# Patient Record
Sex: FEMALE | Race: BLACK OR AFRICAN AMERICAN
[De-identification: names, ages, dates, MRNs, and addresses within clinical notes are randomized per-mention and may not be internally consistent; named-entity substitution may affect disease eponyms.]

---

## 2017-08-16 ENCOUNTER — HOSPITAL ENCOUNTER (OUTPATIENT)
Dept: HOSPITAL 47 - RADUSWWP | Age: 22
End: 2017-08-16
Attending: OBSTETRICS & GYNECOLOGY
Payer: COMMERCIAL

## 2017-08-16 DIAGNOSIS — O44.42: Primary | ICD-10-CM

## 2017-08-16 DIAGNOSIS — Z3A.15: ICD-10-CM

## 2017-08-16 PROCEDURE — 76805 OB US >/= 14 WKS SNGL FETUS: CPT

## 2017-08-16 NOTE — US
EXAMINATION TYPE: US OB >= 14 wk fetus

 

DATE OF EXAM: 8/16/2017

 

COMPARISON: None

 

CLINICAL HISTORY: 22-year-old female O99.211 Obesity affecting pregnancy. Confirm Dates

 

TECHNIQUE:  Transabdominal (TA)

 

FINDINGS:

 

GESTATIONAL AGE / DATING

Physician Established: (14 weeks/2 days)

** EDC:  02/12/2018

Dates by LMP:  Unknown

Dates by First Scan: No prior

Dates by Current Scan: (15 weeks/3 days +/- 1 week 1 day)

** EDC: 02/04/2018

 

FETAL SURVEY

IUP:  Single

 

PLACENTA: Posterior     

PREVIA:  No Previa, however, the placenta is low lying with the caudal placental margin located 2.1 c
m from the internal cervical os.

 

SEBASTIEN: 11.4 cm Normal

 

CERVICAL LENGTH (transabdominal: norm > 3.0cm): 3.7 cm

 

 

FETAL BIOMETRY

PRESENTATION:  Breech

 

BPD: 3.0 cm**  15 weeks / 4 days

HC: 11.3 cm**  15 weeks / 4 days

AC: 9.2 cm**  15 weeks / 3 days

FL: 1.7 cm**  15 weeks / 0 days

 

ESTIMATED FETAL WEIGHT IN GRAMS:  117.8 grams

ESTIMATED FETAL WEIGHT IN LBS/OZS:  0 lbs. 4 oz. 

WEIGHT PERCENTAGE BASED ON ESTABLISHED DATES:   92.8%

 

HC/AC: 1.23 Normal

FL/AC: 18 Normal 

 

HEART RATE:  166 bpm 

RHYTHM:  Normal

 

 

**Single, viable IUP/ No abnormality seen at this time**

 

 

 

 

IMPRESSION:

 

1. Single live intrauterine pregnancy with established gestational age of 14 weeks 2 days. Current ul
trasound biometry is larger and just barely concordant (15 weeks 3 days). This places the gestation a
t the 93rd percentile for weight. Continued follow-up as clinically indicated.

2. Low-lying posterior placenta with the inferior placental tip measuring 2.1 cm from the internal ce
rvical os.

3. Complete fetal survey recommended at 18-20 weeks.

## 2017-09-15 ENCOUNTER — HOSPITAL ENCOUNTER (OUTPATIENT)
Dept: HOSPITAL 47 - RADUSWWP | Age: 22
Discharge: HOME | End: 2017-09-15
Attending: OBSTETRICS & GYNECOLOGY
Payer: COMMERCIAL

## 2017-09-15 DIAGNOSIS — Z3A.00: ICD-10-CM

## 2017-09-15 DIAGNOSIS — O99.212: Primary | ICD-10-CM

## 2017-09-15 PROCEDURE — 76811 OB US DETAILED SNGL FETUS: CPT

## 2017-09-15 NOTE — US
EXAMINATION TYPE: US OB fetal anatomy transabd

 

DATE OF EXAM: 9/15/2017

 

COMPARISON: NONE

 

HISTORY: O99.212 Obesity Effecting Pregnancy 2nd Trimester

 

TECHNIQUE:  Transabdominal (TA)

 

EXAM MEASUREMENTS:

 

GESTATIONAL AGE / DATING

Physician Established: (19 weeks/5 days)

** EDC:  02/04/18

Dates by LMP:  unknown

Dates by First Scan:  (19 weeks/5 days)

** EDC: 02/04/18

Dates by Current Scan for:  (20 weeks/2 days)

** EDC: 01/31/18

 

FETAL SURVEY

IUP:  Single

PLACENTA: Posterior     

PREVIA: No previa   

** SEBASTIEN: 16.9 cm Normal

CERVICAL LENGTH (transabdominal: norm > 3.0cm): 3.4 cm

 

 

FETAL BIOMETRY

PRESENTATION:   Variable

FETAL LIE:  Transverse lie with head maternal LT

BPD: 4.6 cm

**  20 weeks / 0 days

HC: 17.6 cm

**  20 weeks / 1 days

AC: 14.8 cm

**  20 weeks / 1 days

FL: 3.4 cm

**  20 weeks / 5 days

ESTIMATED FETAL WEIGHT IN GRAMS:  346 grams

ESTIMATED FETAL WEIGHT IN LBS/OZ:  0 lbs. 12 oz. 

WEIGHT PERCENTAGE BASED ON ESTABLISHED DATE:  79 %

** HC/AC:  1.19  Normal 

** FL/AC:  23% Normal

HEART RATE:  144 bpm 

RHYTHM: Normal

 

ANATOMY SEEN (within normal limits): 

* Lateral Vent (< 1 cm) 0.8 cm

* Cisterna Magna (< 1.1 cm) 0.4 cm

* Nuchal Fold (< 0.6 cm) 0.3 cm

* Cerebellum (varies with age) 1.8 cm

Choroid Plexus (bilateral)

Midline Falx 

Cavus Septi Pellucidi   

Four Chamber Heart

Outflow tracts:  LVOT/RVOT

Stomach

Situs

Nose / Lips 

Diaphragm 

Kidneys (bilateral) 

Bladder

Cord Insert 

Three Vessel Cord 

Longitudinal Spine 

Transverse Spine 

Arms (bilateral)

Legs (bilateral)

 

MATERNAL WALL MEASUREMENT:  4.2 cm from skin to anterior uterine wall (if exam limited due to body ha
bitus).

 

Single viable IUP 20wks/2days with ALEX of 01/31/18

 

 

 

IMPRESSION: There is satisfactory fetal growth compared to old exam of 8/16/2017. I see no complicati
ng process.

## 2017-11-24 ENCOUNTER — HOSPITAL ENCOUNTER (OUTPATIENT)
Dept: HOSPITAL 47 - RADUSWWP | Age: 22
Discharge: HOME | End: 2017-11-24
Attending: OBSTETRICS & GYNECOLOGY
Payer: COMMERCIAL

## 2017-11-24 DIAGNOSIS — O99.212: Primary | ICD-10-CM

## 2017-11-24 DIAGNOSIS — Z3A.30: ICD-10-CM

## 2017-11-24 PROCEDURE — 76805 OB US >/= 14 WKS SNGL FETUS: CPT

## 2017-11-24 NOTE — US
EXAMINATION TYPE: US OB >= 14 wk fetus

 

DATE OF EXAM: 11/24/2017

 

COMPARISON: 8/16/2017

 

HISTORY:  22-year-old female O99.212 Obesity complicating pregnancy, second trimester.

 

TECHNIQUE:  Transabdominal (TA)

 

FINDINGS:

 

EXAM MEASUREMENTS:

 

GESTATIONAL AGE / DATING

Physician Established: (29 weeks/5 days)

** EDC:  02/04/2018

Dates by LMP:  (28 weeks/ 4   days)

** EDC: 02/12/2018

Dates by First Scan:  (29 weeks/5 days)

** EDC: 02/04/2018

Dates by Current Scan for:  (30 weeks/2 days)

** EDC: 01/31/2018

 

FETAL SURVEY

IUP:  Single

 

PLACENTA: Posterior     

PREVIA: No previa   

 

SEBASTIEN: 12.6 cm Normal

 

CERVICAL LENGTH (transabdominal: norm > 3.0cm): 3.0 cm

 

 

FETAL BIOMETRY

 

PRESENTATION:   Vertex

FETAL LIE:  Longitudinal

 

BPD: 7.5 cm**  30 weeks / 2 days

HC: 28.5 cm**  31 weeks / 3 days

AC: 26.3 cm**  30 weeks / 3 days

FL: 5.78 cm**  30 weeks / 2 days

 

ESTIMATED FETAL WEIGHT IN GRAMS:  1575 grams

ESTIMATED FETAL WEIGHT IN LBS/OZ:  3 lbs. 8 oz. 

 

WEIGHT PERCENTAGE BASED ON ESTABLISHED DATE:  94.9 % (versus 92.8% on 8/16/2017).

 

** HC/AC:  1.09  Normal 

** FL/AC:  21.98 Normal

 

HEART RATE:  155 bpm 

RHYTHM: Normal

 

 

SONOGRAPHER NOTES:  Viable IUP, measurements congruent with physician established dates. 

 

 

 

IMPRESSION:

 

1. Single live intrauterine pregnancy with established gestational age of 29 weeks 5 days by prior da
ting scan. Current ultrasound biometry is concordant (30 weeks 2 days) placing the child at the 95th 
percentile for weight. This is in comparison to the 93rd percentile on 8/16/2017.

2. Note cervical length at the lower limits of normal at 3.0 cm.

3. Continued follow-up as clinically indicated.

## 2018-01-10 ENCOUNTER — HOSPITAL ENCOUNTER (OUTPATIENT)
Dept: HOSPITAL 47 - RADUSWWP | Age: 23
Discharge: HOME | End: 2018-01-10
Attending: OBSTETRICS & GYNECOLOGY
Payer: COMMERCIAL

## 2018-01-10 DIAGNOSIS — Z3A.36: ICD-10-CM

## 2018-01-10 DIAGNOSIS — O36.63X0: ICD-10-CM

## 2018-01-10 DIAGNOSIS — O99.213: Primary | ICD-10-CM

## 2018-01-10 PROCEDURE — 76805 OB US >/= 14 WKS SNGL FETUS: CPT

## 2018-01-10 NOTE — US
EXAMINATION TYPE: US OB >= 14 wk fetus

 

DATE OF EXAM: 1/10/2018

 

COMPARISON: 2017

 

CLINICAL HISTORY: Growth O99.213, [id number],  2, para 1

 

TECHNIQUE:  Transabdominal (TA)

 

GESTATIONAL AGE / DATING

Physician Established: (36 weeks/3 days) 

** EDC:  2018

Dates by LMP: (35 weeks/2 days) 

** EDC: 2018

Dates by First Scan: (36 weeks/3 days) 

** EDC: 2018

Dates by Current Scan: (36 weeks/6 days) 

** EDC: 2018 

 

FETAL SURVEY

IUP:  Single

PLACENTA: Posterior     

PREVIA:  No Previa

** SEBASTIEN: 15.1 cm Normal

CERVICAL LENGTH (transabdominal: norm > 3.0cm): 3.4 cm

 

FETAL BIOMETRY

PRESENTATION:  Vertex

BPD: 9.3 cm

**  37 weeks / 4 days

HC: 32.9 cm

**  37 weeks / 3 days

AC: 33.3 cm

**  37 weeks / 1 days

FL: 7.2 cm

**  36 weeks / 6 days

ESTIMATED FETAL WEIGHT IN GRAMS:  3138 grams

ESTIMATED FETAL WEIGHT IN LBS/OZ:  6 lbs. 15 oz. 

WEIGHT PERCENTAGE BASED ON ESTABLISHED DATES:   74%

HC/AC: 0.99 Normal

FL/AC: 21.58 Normal 

HEART RATE:  140 bpm 

RHYTHM:  Normal

 

MATERNAL WALL MEASUREMENT: 4.4 cm from skin to anterior uterine wall (if exam limited due to body hab
itus).

 

 

 

 

 

IMPRESSION:

Viable single IUP measuring 36 weeks 6 days with a heart rate of 140bpm and an estimated delivery francia
e of 2018.

## 2019-06-05 ENCOUNTER — HOSPITAL ENCOUNTER (OUTPATIENT)
Dept: HOSPITAL 47 - LABWHC1 | Age: 24
Discharge: HOME | End: 2019-06-05
Attending: PHYSICIAN ASSISTANT
Payer: COMMERCIAL

## 2019-06-05 DIAGNOSIS — F33.0: Primary | ICD-10-CM

## 2019-06-05 LAB
ALBUMIN SERPL-MCNC: 3.9 G/DL (ref 3.8–4.9)
ALBUMIN/GLOB SERPL: 2.17 G/DL (ref 1.6–3.17)
ALP SERPL-CCNC: 78 U/L (ref 41–126)
ALT SERPL-CCNC: 34 U/L (ref 8–44)
ANION GAP SERPL CALC-SCNC: 4.6 MMOL/L (ref 4–12)
AST SERPL-CCNC: 31 U/L (ref 13–35)
BASOPHILS # BLD AUTO: 0 K/UL (ref 0–0.2)
BASOPHILS NFR BLD AUTO: 0 %
BUN SERPL-SCNC: 10 MG/DL (ref 9–27)
CALCIUM SPEC-MCNC: 8.9 MG/DL (ref 8.7–10.3)
CHLORIDE SERPL-SCNC: 109 MMOL/L (ref 96–109)
CO2 SERPL-SCNC: 27.4 MMOL/L (ref 21.6–31.8)
EOSINOPHIL # BLD AUTO: 0.2 K/UL (ref 0–0.7)
EOSINOPHIL NFR BLD AUTO: 3 %
ERYTHROCYTE [DISTWIDTH] IN BLOOD BY AUTOMATED COUNT: 4.68 M/UL (ref 3.8–5.4)
ERYTHROCYTE [DISTWIDTH] IN BLOOD: 13.1 % (ref 11.5–15.5)
GLOBULIN SER CALC-MCNC: 1.8 G/DL (ref 1.6–3.3)
GLUCOSE SERPL-MCNC: 89 MG/DL (ref 70–110)
HCT VFR BLD AUTO: 42.2 % (ref 34–46)
HGB BLD-MCNC: 13 GM/DL (ref 11.4–16)
LYMPHOCYTES # SPEC AUTO: 2.1 K/UL (ref 1–4.8)
LYMPHOCYTES NFR SPEC AUTO: 27 %
MCH RBC QN AUTO: 27.7 PG (ref 25–35)
MCHC RBC AUTO-ENTMCNC: 30.7 G/DL (ref 31–37)
MCV RBC AUTO: 90.2 FL (ref 80–100)
MONOCYTES # BLD AUTO: 0.3 K/UL (ref 0–1)
MONOCYTES NFR BLD AUTO: 4 %
NEUTROPHILS # BLD AUTO: 5 K/UL (ref 1.3–7.7)
NEUTROPHILS NFR BLD AUTO: 65 %
PLATELET # BLD AUTO: 256 K/UL (ref 150–450)
POTASSIUM SERPL-SCNC: 4.5 MMOL/L (ref 3.5–5.5)
PROT SERPL-MCNC: 5.7 G/DL (ref 6.2–8.2)
SODIUM SERPL-SCNC: 141 MMOL/L (ref 135–145)
T4 FREE SERPL-MCNC: 1 NG/DL (ref 0.8–1.8)
VIT B12 SERPL-MCNC: 245 PG/ML (ref 200–944)
WBC # BLD AUTO: 7.7 K/UL (ref 3.8–10.6)

## 2019-06-05 PROCEDURE — 80053 COMPREHEN METABOLIC PANEL: CPT

## 2019-06-05 PROCEDURE — 36415 COLL VENOUS BLD VENIPUNCTURE: CPT

## 2019-06-05 PROCEDURE — 82607 VITAMIN B-12: CPT

## 2019-06-05 PROCEDURE — 85025 COMPLETE CBC W/AUTO DIFF WBC: CPT

## 2019-06-05 PROCEDURE — 84443 ASSAY THYROID STIM HORMONE: CPT

## 2019-06-05 PROCEDURE — 84439 ASSAY OF FREE THYROXINE: CPT

## 2019-06-05 PROCEDURE — 82746 ASSAY OF FOLIC ACID SERUM: CPT

## 2021-10-21 ENCOUNTER — HOSPITAL ENCOUNTER (EMERGENCY)
Dept: HOSPITAL 47 - EC | Age: 26
Discharge: HOME | End: 2021-10-21
Payer: COMMERCIAL

## 2021-10-21 VITALS — TEMPERATURE: 100.4 F | DIASTOLIC BLOOD PRESSURE: 94 MMHG | HEART RATE: 79 BPM | SYSTOLIC BLOOD PRESSURE: 141 MMHG

## 2021-10-21 VITALS — RESPIRATION RATE: 18 BRPM

## 2021-10-21 DIAGNOSIS — R11.2: Primary | ICD-10-CM

## 2021-10-21 DIAGNOSIS — Z91.018: ICD-10-CM

## 2021-10-21 DIAGNOSIS — R50.9: ICD-10-CM

## 2021-10-21 DIAGNOSIS — R19.7: ICD-10-CM

## 2021-10-21 DIAGNOSIS — Z20.822: ICD-10-CM

## 2021-10-21 LAB
ALBUMIN SERPL-MCNC: 4.8 G/DL (ref 3.5–5)
ALP SERPL-CCNC: 104 U/L (ref 38–126)
ALT SERPL-CCNC: 34 U/L (ref 4–34)
ANION GAP SERPL CALC-SCNC: 15 MMOL/L
AST SERPL-CCNC: 49 U/L (ref 14–36)
BASOPHILS # BLD AUTO: 0.1 K/UL (ref 0–0.2)
BASOPHILS NFR BLD AUTO: 0 %
BILIRUB UR QL STRIP.AUTO: (no result)
BUN SERPL-SCNC: 16 MG/DL (ref 7–17)
CALCIUM SPEC-MCNC: 10.3 MG/DL (ref 8.4–10.2)
CHLORIDE SERPL-SCNC: 94 MMOL/L (ref 98–107)
CO2 SERPL-SCNC: 27 MMOL/L (ref 22–30)
EOSINOPHIL # BLD AUTO: 0 K/UL (ref 0–0.7)
EOSINOPHIL NFR BLD AUTO: 0 %
ERYTHROCYTE [DISTWIDTH] IN BLOOD BY AUTOMATED COUNT: 5.1 M/UL (ref 3.8–5.4)
ERYTHROCYTE [DISTWIDTH] IN BLOOD: 11.9 % (ref 11.5–15.5)
GLUCOSE SERPL-MCNC: 104 MG/DL (ref 74–99)
HCT VFR BLD AUTO: 45.3 % (ref 34–46)
HGB BLD-MCNC: 15.2 GM/DL (ref 11.4–16)
KETONES UR QL STRIP.AUTO: (no result)
LYMPHOCYTES # SPEC AUTO: 3.5 K/UL (ref 1–4.8)
LYMPHOCYTES NFR SPEC AUTO: 28 %
MCH RBC QN AUTO: 29.8 PG (ref 25–35)
MCHC RBC AUTO-ENTMCNC: 33.5 G/DL (ref 31–37)
MCV RBC AUTO: 88.8 FL (ref 80–100)
MONOCYTES # BLD AUTO: 0.6 K/UL (ref 0–1)
MONOCYTES NFR BLD AUTO: 5 %
NEUTROPHILS # BLD AUTO: 8.1 K/UL (ref 1.3–7.7)
NEUTROPHILS NFR BLD AUTO: 65 %
PH UR: 6.5 [PH] (ref 5–8)
PLATELET # BLD AUTO: 363 K/UL (ref 150–450)
POTASSIUM SERPL-SCNC: 3.5 MMOL/L (ref 3.5–5.1)
PROT SERPL-MCNC: 8.6 G/DL (ref 6.3–8.2)
PROT UR QL: (no result)
RBC UR QL: 2 /HPF (ref 0–5)
SODIUM SERPL-SCNC: 136 MMOL/L (ref 137–145)
SP GR UR: 1.03 (ref 1–1.03)
SQUAMOUS UR QL AUTO: 15 /HPF (ref 0–4)
UROBILINOGEN UR QL STRIP: 4 MG/DL (ref ?–2)
WBC # BLD AUTO: 12.5 K/UL (ref 3.8–10.6)
WBC # UR AUTO: 10 /HPF (ref 0–5)

## 2021-10-21 PROCEDURE — 85025 COMPLETE CBC W/AUTO DIFF WBC: CPT

## 2021-10-21 PROCEDURE — 99284 EMERGENCY DEPT VISIT MOD MDM: CPT

## 2021-10-21 PROCEDURE — 87635 SARS-COV-2 COVID-19 AMP PRB: CPT

## 2021-10-21 PROCEDURE — 80053 COMPREHEN METABOLIC PANEL: CPT

## 2021-10-21 PROCEDURE — 74177 CT ABD & PELVIS W/CONTRAST: CPT

## 2021-10-21 PROCEDURE — 81001 URINALYSIS AUTO W/SCOPE: CPT

## 2021-10-21 PROCEDURE — 36415 COLL VENOUS BLD VENIPUNCTURE: CPT

## 2021-10-21 PROCEDURE — 96375 TX/PRO/DX INJ NEW DRUG ADDON: CPT

## 2021-10-21 PROCEDURE — 96374 THER/PROPH/DIAG INJ IV PUSH: CPT

## 2021-10-21 PROCEDURE — 81025 URINE PREGNANCY TEST: CPT

## 2021-10-21 PROCEDURE — 96361 HYDRATE IV INFUSION ADD-ON: CPT

## 2021-10-21 NOTE — CT
EXAMINATION TYPE: CT abdomen pelvis w con

 

DATE OF EXAM: 10/21/2021

 

COMPARISON: None

 

HISTORY: Generalized pain with vomiting.

 

CT DLP: 1610 mGycm

 

CONTRAST: 

CT scan of the abdomen and pelvis is performed without Oral Contrast and with IV Contrast, patient in
jected with 100 mL of Isovue 300.

 

FINDINGS: 

LUNG BASES-: No visible nodule.  No infiltrate. 

 

LIVER/GB:   No calcified gallstones. Hypoechoic area adjacent to the falciform ligament of the liver 
to reflect focal fat. Underlying lesion is difficult to exclude. Gallbladder fold is noted. Biliary t
ree is of normal caliber. 

 

PANCREAS:  No inflammation.  No distinct mass. 

 

SPLEEN:  No splenic enlargement.  No lesion seen. 

 

ADRENALS:  No nodule.  No thickening. 

 

KIDNEYS/BLADDER:  No hydronephrosis.  No nephrolithiasis.  No distinct renal mass.  Urinary bladder g
rossly unremarkable. 

 

BOWEL: Normal appendix.  Normal bowel caliber.  No inflammation.

 

GENITAL ORGANS:  No gross abnormality. IUD is noted to be in place.

 

LYMPH NODES:  No greater than 1cm abdominal or pelvic lymph nodes are appreciated.

 

AORTA: No significant abnormality. 

 

OSSEOUS STRUCTURES:  No significant abnormality is seen. 

 

OTHER:  No significant additional abnormality is seen. 

 

IMPRESSION: 

1. Hypoechoic area adjacent to the falciform ligament of the liver to reflect focal fat. Underlying l
esion is difficult to exclude. 

2. No acute inflammatory process seen.

## 2021-10-21 NOTE — ED
General Adult HPI





- General


Chief complaint: Nausea/Vomiting/Diarrhea


Stated complaint: vomiting


Time Seen by Provider: 10/21/21 11:13


Source: patient


Mode of arrival: ambulatory


Limitations: no limitations





- History of Present Illness


Initial comments: 





26-year-old female with a past medical history of migraines presents to the 

emergency room for a chief complaint of nausea vomiting.  Patient states she has

had nausea and vomiting for about a week now.  That she has been trying to keep 

down fluids such as water and Gatorade is unable.  States that she has tried 

breath but cannot keep that down.  She denies any abdominal pain.  Denies fevers

or chills.  Denies diarrhea.Patient has no other complaints at this time 

including shortness of breath, chest pain, abdominal pain, headache, or visual 

changes.





- Related Data


                                Home Medications











 Medication  Instructions  Recorded  Confirmed


 


Pnv,Calcium 72/Iron/Folic Acid  11/01/15 11/02/15





[Pnv Prenatal Plus Multivit Tab]   








                                  Previous Rx's











 Medication  Instructions  Recorded


 


Acetaminophen-Codeine 300-30mg 1 each PO Q4HR PRN #30 tab 11/03/15





[Tylenol w/codeine #3]  


 


Ibuprofen [Motrin] 600 mg PO Q6HR PRN #60 tab 11/03/15


 


Ondansetron [Zofran ODT] 4 mg PO Q8HR PRN #15 tab 10/21/21











                                    Allergies











Allergy/AdvReac Type Severity Reaction Status Date / Time


 


venom-honey bee Allergy  Swelling Verified 10/21/21 11:11





[bee venom (honey bee)]     














Review of Systems


ROS Statement: 


Those systems with pertinent positive or pertinent negative responses have been 

documented in the HPI.





ROS Other: All systems not noted in ROS Statement are negative.





Past Medical History


Additional Past Medical History / Comment(s): MIGRAINES


History of Any Multi-Drug Resistant Organisms: None Reported


Past Surgical History: No Surgical Hx Reported


Past Anesthesia/Blood Transfusion Reactions: No Reported Reaction


Past Psychological History: Depression


Smoking Status: Never smoker


Past Alcohol Use History: None Reported


Past Drug Use History: None Reported





- Past Family History


  ** Mother


Family Medical History: Hypertension





General Exam


Limitations: no limitations


General appearance: alert, in no apparent distress


Head exam: Present: atraumatic


Eye exam: Present: normal appearance, PERRL, EOMI.  Absent: scleral icterus, 

conjunctival injection


ENT exam: Present: normal exam, mucous membranes moist


Neck exam: Present: normal inspection, full ROM.  Absent: tenderness


Respiratory exam: Present: normal lung sounds bilaterally.  Absent: respiratory 

distress, wheezes


Cardiovascular Exam: Present: regular rate, normal rhythm, normal heart sounds


GI/Abdominal exam: Present: soft, normal bowel sounds.  Absent: distended, 

tenderness





Course


                                   Vital Signs











  10/21/21 10/21/21 10/21/21





  11:06 12:11 13:57


 


Temperature 100.1 F H 100 F H 100.2 F H


 


Pulse Rate 101 H 73 88


 


Respiratory 19 18 18





Rate   


 


Blood Pressure 144/99 134/91 132/92


 


O2 Sat by Pulse 97 100 99





Oximetry   














Medical Decision Making





- Medical Decision Making





Vitals are stable.  Patient does have a low-grade fever.  Patient is well-

appearing.  CBC CMP unremarkable.  Urinalysis does show 3+ ketones, patient was 

given a liter of fluids.  Coronavirus and hCG negative.  CT abdomen and pelvis 

showed some focal fatty infiltration but otherwise is unremarkable.  Patient was

given antiemetics and did feel better was able to tolerate oral intake.  She did

not have any emesis here in the emergency room.  At this time I am suspicious 

for a viral gastrointestinal syndrome.  Patient is stable to be discharged home 

to follow up with primary care.  She should return here for any worsening 

symptoms.  I did discuss strict return parameters which as if she is unable to 

keep down fluids with the nausea medicine.





- Lab Data


Result diagrams: 


                                 10/21/21 11:50





                                 10/21/21 11:50


                                   Lab Results











  10/21/21 10/21/21 10/21/21 Range/Units





  11:50 11:50 11:50 


 


WBC  12.5 H    (3.8-10.6)  k/uL


 


RBC  5.10    (3.80-5.40)  m/uL


 


Hgb  15.2    (11.4-16.0)  gm/dL


 


Hct  45.3    (34.0-46.0)  %


 


MCV  88.8    (80.0-100.0)  fL


 


MCH  29.8    (25.0-35.0)  pg


 


MCHC  33.5    (31.0-37.0)  g/dL


 


RDW  11.9    (11.5-15.5)  %


 


Plt Count  363    (150-450)  k/uL


 


MPV  7.3    


 


Neutrophils %  65    %


 


Lymphocytes %  28    %


 


Monocytes %  5    %


 


Eosinophils %  0    %


 


Basophils %  0    %


 


Neutrophils #  8.1 H    (1.3-7.7)  k/uL


 


Lymphocytes #  3.5    (1.0-4.8)  k/uL


 


Monocytes #  0.6    (0-1.0)  k/uL


 


Eosinophils #  0.0    (0-0.7)  k/uL


 


Basophils #  0.1    (0-0.2)  k/uL


 


Sodium     (137-145)  mmol/L


 


Potassium     (3.5-5.1)  mmol/L


 


Chloride     ()  mmol/L


 


Carbon Dioxide     (22-30)  mmol/L


 


Anion Gap     mmol/L


 


BUN     (7-17)  mg/dL


 


Creatinine     (0.52-1.04)  mg/dL


 


Est GFR (CKD-EPI)AfAm     (>60 ml/min/1.73 sqM)  


 


Est GFR (CKD-EPI)NonAf     (>60 ml/min/1.73 sqM)  


 


Glucose     (74-99)  mg/dL


 


Calcium     (8.4-10.2)  mg/dL


 


Total Bilirubin     (0.2-1.3)  mg/dL


 


AST     (14-36)  U/L


 


ALT     (4-34)  U/L


 


Alkaline Phosphatase     ()  U/L


 


Total Protein     (6.3-8.2)  g/dL


 


Albumin     (3.5-5.0)  g/dL


 


Urine Color   Yellow   


 


Urine Appearance   Cloudy H   (Clear)  


 


Urine pH   6.5   (5.0-8.0)  


 


Ur Specific Gravity   1.033   (1.001-1.035)  


 


Urine Protein   1+ H   (Negative)  


 


Urine Glucose (UA)   Negative   (Negative)  


 


Urine Ketones   3+ H   (Negative)  


 


Urine Blood   Negative   (Negative)  


 


Urine Nitrite   Negative   (Negative)  


 


Urine Bilirubin   1+ H   (Negative)  


 


Urine Urobilinogen   4.0   (<2.0)  mg/dL


 


Ur Leukocyte Esterase   Moderate H   (Negative)  


 


Urine RBC   2   (0-5)  /hpf


 


Urine WBC   10 H   (0-5)  /hpf


 


Ur Squamous Epith Cells   15 H   (0-4)  /hpf


 


Amorphous Sediment   Rare H   (None)  /hpf


 


Urine Bacteria   Rare H   (None)  /hpf


 


Urine Mucus   Many H   (None)  /hpf


 


Urine HCG, Qual    Not Detected  (Not Detectd)  


 


Coronavirus (PCR)     (Not Detectd)  














  10/21/21 10/21/21 Range/Units





  11:50 11:50 


 


WBC    (3.8-10.6)  k/uL


 


RBC    (3.80-5.40)  m/uL


 


Hgb    (11.4-16.0)  gm/dL


 


Hct    (34.0-46.0)  %


 


MCV    (80.0-100.0)  fL


 


MCH    (25.0-35.0)  pg


 


MCHC    (31.0-37.0)  g/dL


 


RDW    (11.5-15.5)  %


 


Plt Count    (150-450)  k/uL


 


MPV    


 


Neutrophils %    %


 


Lymphocytes %    %


 


Monocytes %    %


 


Eosinophils %    %


 


Basophils %    %


 


Neutrophils #    (1.3-7.7)  k/uL


 


Lymphocytes #    (1.0-4.8)  k/uL


 


Monocytes #    (0-1.0)  k/uL


 


Eosinophils #    (0-0.7)  k/uL


 


Basophils #    (0-0.2)  k/uL


 


Sodium  136 L   (137-145)  mmol/L


 


Potassium  3.5   (3.5-5.1)  mmol/L


 


Chloride  94 L   ()  mmol/L


 


Carbon Dioxide  27   (22-30)  mmol/L


 


Anion Gap  15   mmol/L


 


BUN  16   (7-17)  mg/dL


 


Creatinine  0.86   (0.52-1.04)  mg/dL


 


Est GFR (CKD-EPI)AfAm  >90   (>60 ml/min/1.73 sqM)  


 


Est GFR (CKD-EPI)NonAf  >90   (>60 ml/min/1.73 sqM)  


 


Glucose  104 H   (74-99)  mg/dL


 


Calcium  10.3 H   (8.4-10.2)  mg/dL


 


Total Bilirubin  0.9   (0.2-1.3)  mg/dL


 


AST  49 H   (14-36)  U/L


 


ALT  34   (4-34)  U/L


 


Alkaline Phosphatase  104   ()  U/L


 


Total Protein  8.6 H   (6.3-8.2)  g/dL


 


Albumin  4.8   (3.5-5.0)  g/dL


 


Urine Color    


 


Urine Appearance    (Clear)  


 


Urine pH    (5.0-8.0)  


 


Ur Specific Gravity    (1.001-1.035)  


 


Urine Protein    (Negative)  


 


Urine Glucose (UA)    (Negative)  


 


Urine Ketones    (Negative)  


 


Urine Blood    (Negative)  


 


Urine Nitrite    (Negative)  


 


Urine Bilirubin    (Negative)  


 


Urine Urobilinogen    (<2.0)  mg/dL


 


Ur Leukocyte Esterase    (Negative)  


 


Urine RBC    (0-5)  /hpf


 


Urine WBC    (0-5)  /hpf


 


Ur Squamous Epith Cells    (0-4)  /hpf


 


Amorphous Sediment    (None)  /hpf


 


Urine Bacteria    (None)  /hpf


 


Urine Mucus    (None)  /hpf


 


Urine HCG, Qual    (Not Detectd)  


 


Coronavirus (PCR)   Not Detected  (Not Detectd)  














Disposition


Clinical Impression: 


 Nausea & vomiting





Disposition: HOME SELF-CARE


Condition: Good


Instructions (If sedation given, give patient instructions):  Acute Nausea and 

Vomiting (ED)


Additional Instructions: 


Please follow up with primary care in 1-2 days. Return to the emergency room for

any worsening symptoms such as if you are unable to tolerate oral intake with 

the nausea medicine.


Prescriptions: 


Ondansetron [Zofran ODT] 4 mg PO Q8HR PRN #15 tab


 PRN Reason: Nausea


Is patient prescribed a controlled substance at d/c from ED?: No


Referrals: 


[facility] [Primary Care Provider] - 1-2 days


Time of Disposition: 15:18

## 2024-08-11 ENCOUNTER — HOSPITAL ENCOUNTER (EMERGENCY)
Dept: HOSPITAL 47 - EC | Age: 29
Discharge: HOME | End: 2024-08-11
Payer: COMMERCIAL

## 2024-08-11 DIAGNOSIS — N39.0: Primary | ICD-10-CM

## 2024-08-11 PROCEDURE — 99284 EMERGENCY DEPT VISIT MOD MDM: CPT

## 2024-08-11 PROCEDURE — 96374 THER/PROPH/DIAG INJ IV PUSH: CPT
